# Patient Record
Sex: FEMALE | Race: WHITE
[De-identification: names, ages, dates, MRNs, and addresses within clinical notes are randomized per-mention and may not be internally consistent; named-entity substitution may affect disease eponyms.]

---

## 2018-05-23 ENCOUNTER — HOSPITAL ENCOUNTER (EMERGENCY)
Dept: HOSPITAL 17 - PHED | Age: 43
Discharge: HOME | End: 2018-05-23
Payer: MEDICAID

## 2018-05-23 VITALS — SYSTOLIC BLOOD PRESSURE: 106 MMHG | DIASTOLIC BLOOD PRESSURE: 64 MMHG

## 2018-05-23 VITALS
RESPIRATION RATE: 16 BRPM | HEART RATE: 65 BPM | TEMPERATURE: 98.3 F | SYSTOLIC BLOOD PRESSURE: 124 MMHG | DIASTOLIC BLOOD PRESSURE: 60 MMHG | OXYGEN SATURATION: 100 %

## 2018-05-23 VITALS — BODY MASS INDEX: 24.58 KG/M2 | HEIGHT: 60 IN | WEIGHT: 125.22 LBS

## 2018-05-23 DIAGNOSIS — M25.561: Primary | ICD-10-CM

## 2018-05-23 PROCEDURE — 99283 EMERGENCY DEPT VISIT LOW MDM: CPT

## 2018-05-23 PROCEDURE — E0113 CRUTCH UNDERARM EACH WOOD: HCPCS

## 2018-05-23 PROCEDURE — 73564 X-RAY EXAM KNEE 4 OR MORE: CPT

## 2018-05-23 PROCEDURE — 96372 THER/PROPH/DIAG INJ SC/IM: CPT

## 2018-05-23 NOTE — PD
HPI


Chief Complaint:  Pain: Acute or Chronic


Time Seen by Provider:  00:43


Travel History


International Travel<30 days:  No


Contact w/Intl Traveler<30days:  No


Traveled to known affect area:  No





History of Present Illness


HPI


The patient is a 42-year-old female who presents to the emergency department 

with her sons for right knee pain.  The patient speaks some English, mostly 

Khmer, son's help translate at bedside per her request.  The patient has a 4 

day history of knee pain with increasing swelling.  She notes limited range of 

motion secondary to pain.  She cannot recall any trauma to the knee and denies 

any known history of gout or pseudogout.  She denies any redness over the 

affected area, does note limited range of motion secondary to pain.  The 

patient denies any pain over the posterior aspect of the right calf and denies 

any swelling of the right lower extremity except over the knee.  She denies any 

history of DVT.  Symptoms are moderate.  She does not have a local primary 

physician.





[facility]


Past Medical History


Immunizations Current:  No


Tetanus Vaccination:  Unknown


Influenza Vaccination:  No


Pregnant?:  Not Pregnant


:  5


Para:  5


Tubal Ligation:  Yes





Past Surgical History


 Section:  Yes


Gynecologic Surgery:  Yes





Social History


Alcohol Use:  No


Tobacco Use:  No


Substance Use:  No





Allergies-Medications


(Allergen,Severity, Reaction):  


Coded Allergies:  


     No Known Allergies (Unverified  Adverse Reaction, Unknown, 18)


Reported Meds & Prescriptions





Reported Meds & Active Scripts


Active








Review of Systems


Except as stated in HPI:  all other systems reviewed are Neg


General / Constitutional:  No: Fever


Musculoskeletal:  Positive: Limited ROM, Edema, Pain


Skin:  No Rash


Neurologic:  No: Paresthesia, Sensory Disturbance





Physical Exam


Narrative


GENERAL: Awake, alert, pleasant 42-year-old female who appears her stated age 

and is in no acute respiratory distress.


SKIN: Focused skin assessment warm/dry.


HEAD: Atraumatic. Normocephalic. 


EYES: Pupils equal and round. No scleral icterus. No injection or drainage. 


MUSCULOSKELETAL: The right knee has some mild edema with compared to the left.  

There is no obvious edema to the calves.  Positive dorsalis pedal pulses.  

Limited range of motion with flexion and extension the knee secondary to pain.  

Patella is midline.  There is no erythema or warmth over the right knee.


NEUROLOGICAL: Awake and alert. No obvious cranial nerve deficits.  Motor 

grossly within normal limits. Normal speech.


PSYCHIATRIC: Appropriate mood and affect; insight and judgment normal.





Data


Data


Last Documented VS





Vital Signs








  Date Time  Temp Pulse Resp B/P (MAP) Pulse Ox O2 Delivery O2 Flow Rate FiO2


 


18 00:36 98.3 65 16 124/60 (81) 100   








Orders





 Orders


Knee, Complete (4vws) (18 )


Ketorolac Inj (Toradol Inj) (18 01:00)


Morphine Inj (Morphine Inj) (18 01:00)


Promethazine Inj (Phenergan Inj) (18 01:00)








[facility]


Medical Decision Making


Medical Screen Exam Complete:  Yes


Emergency Medical Condition:  Yes


Medical Record Reviewed:  Yes


Interpretation(s)


X-ray of the right knee 4 view reveals minimal degenerative change in the 

medial compartment.


Differential Diagnosis


Differential diagnosis includes fracture, gout, pseudogout, effusion, sprain, 

strain, foreign body in the joint, Baker's cyst, DVT.


Narrative Course


A bedside ultrasound was performed, there is no obvious effusion of the right 

knee.  Patient does have limited range of motion but is afebrile, there is no 

erythema, and no effusion, I doubt septic joint.  X-ray of the right knee was 

obtained.  The patient was administered morphine, Phenergan, and Toradol IM.  X-

ray of the right knee reveals minimal degenerative change in the medial 

compartment.  The patient has minimal swelling of the right knee, no obvious 

effusion, somewhat limited range of motion, but no tenderness of the calf.  I 

doubt DVT.  The patient may benefit from outpatient follow-up with orthopedics, 

will be placed on anti-inflammatories and pain medication.  She will also be 

provided an Ace wrap and crutches.





Procedures


**Procedure Narrative**


A bedside ultrasound was performed with a linear probe, there is no evidence of 

effusion on the right knee with ultrasound at bedside.  The patient tolerated 

the ultrasound without difficulty and there was no obvious complications.





Diagnosis





 Primary Impression:  


 Right knee pain


 Qualified Codes:  M25.561 - Pain in right knee


Patient Instructions:  General Instructions





***Additional Instructions:  


Medications as directed.  A scrap and crutches as needed.  Please provide the 

patient a copy of her x-ray results at discharge.  Follow-up with an 

orthopedist.


***Med/Other Pt SpecificInfo:  Prescription(s) given


Scripts


Hydrocodone-Acetaminophen (Norco) 5 Mg-325 Mg Tab


1 TAB PO Q6H Y for PAIN, #12 TAB 0 Refills


   Prov: Brody Sethi MD         18 


Naproxen (Naproxen) 500 Mg Tab


500 MG PO BID for 10 Days, #20 TAB 0 Refills


   Prov: Brody Sethi MD         18


Disposition:  01 DISCHARGE HOME


Condition:  Stable











Brody Sethi MD May 23, 2018 01:05 [id number]

## 2018-05-23 NOTE — RADRPT
EXAM DATE:  5/23/2018 1:14 AM EDT

AGE/SEX:        42 years / Female



INDICATIONS:  Right knee pain from unknown injury.



CLINICAL DATA:  This is the patient's initial encounter. Patient reports that signs and symptoms have
 been present for 1 day and indicates a pain score of 8/10. 

                                                                          

MEDICAL/SURGICAL HISTORY:       None. None.



COMPARISON:      No prior [facility] exams available for comparison.



FINDINGS:  

Bony structures are intact and in normal alignment.  Joints are intact without dislocation or signifi
cant arthropathy.  There is mild joint space loss in the medial compartment with slight sclerosis of 
the tibial plateau. Osseous density is normal.  Soft tissues are unremarkable.  No radiopaque foreign
 bodies seen.



CONCLUSION: 

Minimal degenerative change in the medial compartment.



 







Electronically signed by: Samson Wells MD  5/23/2018 1:17 AM EDT